# Patient Record
Sex: MALE | Race: WHITE | NOT HISPANIC OR LATINO | URBAN - METROPOLITAN AREA
[De-identification: names, ages, dates, MRNs, and addresses within clinical notes are randomized per-mention and may not be internally consistent; named-entity substitution may affect disease eponyms.]

---

## 2017-06-13 ENCOUNTER — OFFICE (OUTPATIENT)
Dept: URBAN - METROPOLITAN AREA CLINIC 78 | Facility: CLINIC | Age: 72
End: 2017-06-13
Payer: OTHER GOVERNMENT

## 2017-06-13 VITALS
SYSTOLIC BLOOD PRESSURE: 136 MMHG | HEIGHT: 69 IN | HEART RATE: 72 BPM | TEMPERATURE: 97.2 F | WEIGHT: 193 LBS | DIASTOLIC BLOOD PRESSURE: 71 MMHG

## 2017-06-13 DIAGNOSIS — K59.09 OTHER CONSTIPATION: ICD-10-CM

## 2017-06-13 DIAGNOSIS — R10.12 LEFT UPPER QUADRANT PAIN: ICD-10-CM

## 2017-06-13 DIAGNOSIS — R12 HEARTBURN: ICD-10-CM

## 2017-06-13 PROCEDURE — 99204 OFFICE O/P NEW MOD 45 MIN: CPT

## 2017-06-13 NOTE — SERVICEHPINOTES
JES MEDRANO   is a   72   year old male who is being seen in consultation at the request of   TRE MERRILL   for stomach pain. His wife is with him. He has been having stomach issues with epigastric pain, substernal burning, belching issues over the past 6 weeks. He reports LUQ discomfort which actually feels better after eating. Sensation of "hot air" coming up through esophagus is happening all the time, regardless of food intake. He was started on ranitidine 150 mg BID around 4 weeks ago and this hasn't seemed to make any difference in his symptoms. Hasn't had significant GERD hx in past. Went to the ER where CBC, CMP, lipase ok though plts slightly low (reports seeing hematology for that). A CT of abd/pelvis w contrast was negative for acute findings. Was given script for Prilosec- hasn't started yet.   He notes a change in bowel habits with slight tendency for constipation. This started at same time as UGI symptoms, and both issues started within a few weeks of starting venlafaxine by psychiatry for depressive symptoms. Last colonoscopy was 10 years ago. Has h/o CAD, s/p triple bypass in 2012 and also had AAA repair in 2013. Follows with cardiology regularly, doing well.

## 2017-07-19 ENCOUNTER — ON CAMPUS - OUTPATIENT (OUTPATIENT)
Dept: URBAN - METROPOLITAN AREA HOSPITAL 14 | Facility: HOSPITAL | Age: 72
End: 2017-07-19
Payer: OTHER GOVERNMENT

## 2017-07-19 DIAGNOSIS — K29.60 OTHER GASTRITIS WITHOUT BLEEDING: ICD-10-CM

## 2017-07-19 DIAGNOSIS — R10.13 EPIGASTRIC PAIN: ICD-10-CM

## 2017-07-19 DIAGNOSIS — K20.8 OTHER ESOPHAGITIS: ICD-10-CM

## 2017-07-19 PROCEDURE — 43239 EGD BIOPSY SINGLE/MULTIPLE: CPT

## 2017-07-26 ENCOUNTER — ON CAMPUS - OUTPATIENT (OUTPATIENT)
Dept: URBAN - METROPOLITAN AREA HOSPITAL 14 | Facility: HOSPITAL | Age: 72
End: 2017-07-26
Payer: OTHER GOVERNMENT

## 2017-07-26 DIAGNOSIS — K59.09 OTHER CONSTIPATION: ICD-10-CM

## 2017-07-26 PROCEDURE — 45378 DIAGNOSTIC COLONOSCOPY: CPT

## 2017-11-03 ENCOUNTER — OFFICE (OUTPATIENT)
Dept: URBAN - METROPOLITAN AREA CLINIC 33 | Facility: CLINIC | Age: 72
End: 2017-11-03
Payer: OTHER GOVERNMENT

## 2017-11-03 VITALS
TEMPERATURE: 97.5 F | WEIGHT: 186 LBS | SYSTOLIC BLOOD PRESSURE: 121 MMHG | HEART RATE: 75 BPM | DIASTOLIC BLOOD PRESSURE: 70 MMHG | HEIGHT: 69 IN

## 2017-11-03 DIAGNOSIS — R14.0 ABDOMINAL DISTENSION (GASEOUS): ICD-10-CM

## 2017-11-03 DIAGNOSIS — R10.12 LEFT UPPER QUADRANT PAIN: ICD-10-CM

## 2017-11-03 DIAGNOSIS — R19.5 OTHER FECAL ABNORMALITIES: ICD-10-CM

## 2017-11-03 PROCEDURE — 99215 OFFICE O/P EST HI 40 MIN: CPT

## 2017-11-03 RX ORDER — RIFAXIMIN 550 MG/1
TABLET ORAL
Qty: 42 | Refills: 0 | Status: COMPLETED
Start: 2017-11-03 | End: 2018-01-18

## 2017-11-03 NOTE — SERVICEHPINOTES
73 yo white male presents with ongoing stomach pain. His wife is with him. Was seen in June for relatively new-onset LUQ pain, bloating, and constipation. (Symptoms had started after he was started on venlafaxine which he is no longer taking). He had an EGD and colonoscopy in July without concerning findings. Continued to have symptoms. Had been given PPI and FDGard to try - apparently not helpful. He had his GB removed in September due to gallstones but without significant change in his GI symptoms. Typically feels well when he awakes in AM but can start having symptoms about an hour after he eats. Will have burning pain in abdomen (LUQ area) and heartburn/belching and feeling of heat in neck area. Gets bloated and can feel nauseated. He gets dry mouth. Symptoms dissipate with time but then worsen again after his next meal. Acid blocking meds have not been helpful. He has low energy. When symptoms are present he becomes quite anxious. He was at ER recently due to symptoms and was started on Levaquin for pneumonia findings on CT (chest CTA done -had elevated D dimer also had CT abd/pelvis without acute findings). Was also given Prevacid 15 mg since hadn't been on any antacid meds at that time. Currently denies constipation. Stools can be somewhat loose.Has h/o pre-diabetes. Also chronic mild thrombocytopenia for which he saw heme in past. Has h/o CAD, s/p triple bypass in 2012 and also had AAA repair in 2013. Follows with cardiology regularly, doing well.

## 2018-01-18 ENCOUNTER — OFFICE (OUTPATIENT)
Dept: URBAN - METROPOLITAN AREA CLINIC 78 | Facility: CLINIC | Age: 73
End: 2018-01-18

## 2018-01-18 VITALS
WEIGHT: 186 LBS | HEART RATE: 68 BPM | TEMPERATURE: 97.7 F | DIASTOLIC BLOOD PRESSURE: 60 MMHG | HEIGHT: 69 IN | SYSTOLIC BLOOD PRESSURE: 143 MMHG

## 2018-01-18 DIAGNOSIS — R14.0 ABDOMINAL DISTENSION (GASEOUS): ICD-10-CM

## 2018-01-18 DIAGNOSIS — R10.12 LEFT UPPER QUADRANT PAIN: ICD-10-CM

## 2018-01-18 DIAGNOSIS — R19.5 OTHER FECAL ABNORMALITIES: ICD-10-CM

## 2018-01-18 PROCEDURE — 99214 OFFICE O/P EST MOD 30 MIN: CPT

## 2018-01-18 RX ORDER — RIFAXIMIN 550 MG/1
TABLET ORAL
Qty: 42 | Refills: 0 | Status: COMPLETED
Start: 2018-01-18 | End: 2018-03-29

## 2018-03-01 ENCOUNTER — OFFICE (OUTPATIENT)
Dept: URBAN - METROPOLITAN AREA CLINIC 78 | Facility: CLINIC | Age: 73
End: 2018-03-01
Payer: COMMERCIAL

## 2018-03-01 VITALS
HEIGHT: 69 IN | TEMPERATURE: 97.6 F | DIASTOLIC BLOOD PRESSURE: 72 MMHG | SYSTOLIC BLOOD PRESSURE: 127 MMHG | HEART RATE: 69 BPM | WEIGHT: 183 LBS

## 2018-03-01 DIAGNOSIS — Z11.59 ENCOUNTER FOR SCREENING FOR OTHER VIRAL DISEASES: ICD-10-CM

## 2018-03-01 DIAGNOSIS — R10.12 LEFT UPPER QUADRANT PAIN: ICD-10-CM

## 2018-03-01 DIAGNOSIS — R29.90 UNSPECIFIED SYMPTOMS AND SIGNS INVOLVING THE NERVOUS SYSTEM: ICD-10-CM

## 2018-03-01 DIAGNOSIS — R19.5 OTHER FECAL ABNORMALITIES: ICD-10-CM

## 2018-03-01 DIAGNOSIS — D69.6 THROMBOCYTOPENIA, UNSPECIFIED: ICD-10-CM

## 2018-03-01 DIAGNOSIS — R14.0 ABDOMINAL DISTENSION (GASEOUS): ICD-10-CM

## 2018-03-01 PROCEDURE — 99215 OFFICE O/P EST HI 40 MIN: CPT

## 2018-03-01 NOTE — SERVICEHPINOTES
73 yo white male presents with his wife for f/u above. Magic Mouthwash was not very helpful. Xifaxan has been helpful. He is currently finishing up a second course of this. He reports normalized BMs (no longer loose) and improvement in bloating. He can still have his LUQ pain but it has improved as well, and today he reports that the left-sided pain has actually been there for many years. Feels better if he gets up and moves around. Notes that he used to drink a lot of alcohol. His anxiety symptoms have improved but he has some weakness in legs and balance issues at times and notes burning sensation in ears, head, and eyes- intermittently. Patient is following lower FODMAP diet and trying to also follow some recommendations based on specific carbohydrate diet as well (his wife is reading a book on this).   Prior hx: Was seen in June for relatively new-onset LUQ pain, bloating, and constipation. (Symptoms had started after he was started on venlafaxine which he is no longer taking). He had an EGD and colonoscopy in July without concerning findings. Continued to have symptoms. Had been given PPI and FDGard to try - apparently not helpful. Also tried IBgard and probiotics. He had his GB removed in September due to gallstones but without significant change in his GI symptoms. His O&ampPx3 was negative and GES without delayed emptying. His weight is stable.Typically feels well when he awakes in AM but can start having symptoms about an hour after he eats. Will have burning pain in abdomen (LUQ area) and heartburn/belching and feeling of heat in neck area. Gets bloated and can feel nauseated. He gets dry mouth. Symptoms dissipate with time but then worsen again after his next meal. Acid blocking meds have not been helpful. He has low energy. When symptoms are present he becomes quite anxious. Has h/o pre-diabetes. Also chronic mild thrombocytopenia for which he saw heme in past. Has h/o CAD, s/p triple bypass in 2012 and also had AAA repair in 2013. Follows with cardiology regularly, doing well.

## 2018-03-01 NOTE — INTERFACERESULTNOTES
Labs are fairly unremarkable other than low platelets and elevated ammonia. Recommend Fibroscan to assess for fat and scar tissue in the liver.

## 2018-03-12 LAB
AMMONIA (P): 71 UMOL/L — HIGH (ref ?–47)
C-REACTIVE PROTEIN: 0.3 MG/L (ref ?–8)
CBC (INCLUDES DIFF/PLT): ABSOLUTE BAND NEUTROPHILS: NORMAL CELLS/UL
CBC (INCLUDES DIFF/PLT): ABSOLUTE BASOPHILS: 29 CELLS/UL (ref 0–200)
CBC (INCLUDES DIFF/PLT): ABSOLUTE BLASTS: NORMAL CELLS/UL
CBC (INCLUDES DIFF/PLT): ABSOLUTE EOSINOPHILS: 110 CELLS/UL (ref 15–500)
CBC (INCLUDES DIFF/PLT): ABSOLUTE LYMPHOCYTES: 1264 CELLS/UL (ref 850–3900)
CBC (INCLUDES DIFF/PLT): ABSOLUTE METAMYELOCYTES: NORMAL CELLS/UL
CBC (INCLUDES DIFF/PLT): ABSOLUTE MONOCYTES: 307 CELLS/UL (ref 200–950)
CBC (INCLUDES DIFF/PLT): ABSOLUTE MYELOCYTES: NORMAL CELLS/UL
CBC (INCLUDES DIFF/PLT): ABSOLUTE NEUTROPHILS: 4089 CELLS/UL (ref 1500–7800)
CBC (INCLUDES DIFF/PLT): ABSOLUTE NUCLEATED RBC: NORMAL CELLS/UL
CBC (INCLUDES DIFF/PLT): ABSOLUTE PROMYELOCYTES: NORMAL CELLS/UL
CBC (INCLUDES DIFF/PLT): BAND NEUTROPHILS: NORMAL %
CBC (INCLUDES DIFF/PLT): BASOPHILS: 0.5 %
CBC (INCLUDES DIFF/PLT): BLASTS: NORMAL %
CBC (INCLUDES DIFF/PLT): COMMENT(S): NORMAL
CBC (INCLUDES DIFF/PLT): EOSINOPHILS: 1.9 %
CBC (INCLUDES DIFF/PLT): HEMATOCRIT: 40 % (ref 38.5–50)
CBC (INCLUDES DIFF/PLT): HEMOGLOBIN: 13.9 G/DL (ref 13.2–17.1)
CBC (INCLUDES DIFF/PLT): LYMPHOCYTES: 21.8 %
CBC (INCLUDES DIFF/PLT): MCH: 31.4 PG (ref 27–33)
CBC (INCLUDES DIFF/PLT): MCHC: 34.8 G/DL (ref 32–36)
CBC (INCLUDES DIFF/PLT): MCV: 90.5 FL (ref 80–100)
CBC (INCLUDES DIFF/PLT): METAMYELOCYTES: NORMAL %
CBC (INCLUDES DIFF/PLT): MONOCYTES: 5.3 %
CBC (INCLUDES DIFF/PLT): MPV: 12.1 FL (ref 7.5–12.5)
CBC (INCLUDES DIFF/PLT): MYELOCYTES: NORMAL %
CBC (INCLUDES DIFF/PLT): NEUTROPHILS: 70.5 %
CBC (INCLUDES DIFF/PLT): NUCLEATED RBC: NORMAL /100 WBC
CBC (INCLUDES DIFF/PLT): PLATELET COUNT: 111 THOUSAND/UL — LOW (ref 140–400)
CBC (INCLUDES DIFF/PLT): PROMYELOCYTES: NORMAL %
CBC (INCLUDES DIFF/PLT): RDW: 12.9 % (ref 11–15)
CBC (INCLUDES DIFF/PLT): REACTIVE LYMPHOCYTES: NORMAL %
CBC (INCLUDES DIFF/PLT): RED BLOOD CELL COUNT: 4.42 MILLION/UL (ref 4.2–5.8)
CBC (INCLUDES DIFF/PLT): WHITE BLOOD CELL COUNT: 5.8 THOUSAND/UL (ref 3.8–10.8)
COMPREHENSIVE METABOLIC PANEL: ALBUMIN/GLOBULIN RATIO: 2 (CALC) (ref 1–2.5)
COMPREHENSIVE METABOLIC PANEL: ALBUMIN: 4.3 G/DL (ref 3.6–5.1)
COMPREHENSIVE METABOLIC PANEL: ALKALINE PHOSPHATASE: 71 U/L (ref 40–115)
COMPREHENSIVE METABOLIC PANEL: ALT: 29 U/L (ref 9–46)
COMPREHENSIVE METABOLIC PANEL: AST: 26 U/L (ref 10–35)
COMPREHENSIVE METABOLIC PANEL: BILIRUBIN, TOTAL: 0.7 MG/DL (ref 0.2–1.2)
COMPREHENSIVE METABOLIC PANEL: BUN/CREATININE RATIO: (no result) (CALC)
COMPREHENSIVE METABOLIC PANEL: CALCIUM: 9.3 MG/DL (ref 8.6–10.3)
COMPREHENSIVE METABOLIC PANEL: CARBON DIOXIDE: 27 MMOL/L (ref 20–31)
COMPREHENSIVE METABOLIC PANEL: CHLORIDE: 109 MMOL/L (ref 98–110)
COMPREHENSIVE METABOLIC PANEL: CREATININE: 0.95 MG/DL (ref 0.7–1.18)
COMPREHENSIVE METABOLIC PANEL: EGFR AFRICAN AMERICAN: 92 ML/MIN/1.73M2 (ref 60–?)
COMPREHENSIVE METABOLIC PANEL: EGFR NON-AFR. AMERICAN: 80 ML/MIN/1.73M2 (ref 60–?)
COMPREHENSIVE METABOLIC PANEL: GLOBULIN: 2.2 G/DL (CALC) (ref 1.9–3.7)
COMPREHENSIVE METABOLIC PANEL: GLUCOSE: 157 MG/DL — HIGH (ref 65–99)
COMPREHENSIVE METABOLIC PANEL: POTASSIUM: 3.9 MMOL/L (ref 3.5–5.3)
COMPREHENSIVE METABOLIC PANEL: PROTEIN, TOTAL: 6.5 G/DL (ref 6.1–8.1)
COMPREHENSIVE METABOLIC PANEL: SODIUM: 143 MMOL/L (ref 135–146)
COMPREHENSIVE METABOLIC PANEL: UREA NITROGEN (BUN): 17 MG/DL (ref 7–25)
FERRITIN: 141 NG/ML (ref 20–380)
HEMOGLOBIN A1C: 5.5 % OF TOTAL HGB (ref ?–5.7)
HEPATITIS C AB W/REFL TO HCV RNA, QN, PCR: HEPATITIS C ANTIBODY: NORMAL
HEPATITIS C AB W/REFL TO HCV RNA, QN, PCR: SIGNAL TO CUT-OFF: 0.02 (ref ?–1)
IRON AND TOTAL IRON BINDING CAPACITY: % SATURATION: 34 % (CALC) (ref 15–60)
IRON AND TOTAL IRON BINDING CAPACITY: IRON BINDING CAPACITY: 277 MCG/DL (CALC) (ref 250–425)
IRON AND TOTAL IRON BINDING CAPACITY: IRON, TOTAL: 94 MCG/DL (ref 50–180)
PROTHROMBIN TIME-INR: INR: 1
PROTHROMBIN TIME-INR: PT: 10.7 SEC (ref 9–11.5)
SED RATE BY MODIFIED WESTERGREN: 2 MM/H (ref ?–20)
VITAMIN B12/FOLATE, SERUM PANEL: FOLATE, SERUM: 22.1 NG/ML
VITAMIN B12/FOLATE, SERUM PANEL: VITAMIN B12: 435 PG/ML (ref 200–1100)
VITAMIN D,25-OH,TOTAL,IA: 39 NG/ML (ref 30–100)
ZINC: 66 MCG/DL (ref 60–130)

## 2018-03-20 LAB — PANCREATIC ELASTASE-1: >500 MCG/G

## 2018-03-29 ENCOUNTER — OFFICE (OUTPATIENT)
Dept: URBAN - METROPOLITAN AREA CLINIC 78 | Facility: CLINIC | Age: 73
End: 2018-03-29
Payer: COMMERCIAL

## 2018-03-29 VITALS
SYSTOLIC BLOOD PRESSURE: 156 MMHG | HEIGHT: 69 IN | HEART RATE: 63 BPM | TEMPERATURE: 97.6 F | WEIGHT: 181 LBS | DIASTOLIC BLOOD PRESSURE: 64 MMHG

## 2018-03-29 DIAGNOSIS — L29.8 OTHER PRURITUS: ICD-10-CM

## 2018-03-29 DIAGNOSIS — R14.0 ABDOMINAL DISTENSION (GASEOUS): ICD-10-CM

## 2018-03-29 DIAGNOSIS — D69.6 THROMBOCYTOPENIA, UNSPECIFIED: ICD-10-CM

## 2018-03-29 DIAGNOSIS — R19.5 OTHER FECAL ABNORMALITIES: ICD-10-CM

## 2018-03-29 DIAGNOSIS — R29.90 UNSPECIFIED SYMPTOMS AND SIGNS INVOLVING THE NERVOUS SYSTEM: ICD-10-CM

## 2018-03-29 DIAGNOSIS — R10.12 LEFT UPPER QUADRANT PAIN: ICD-10-CM

## 2018-03-29 PROCEDURE — 99215 OFFICE O/P EST HI 40 MIN: CPT

## 2018-03-29 RX ORDER — CROMOLYN SODIUM 20 MG/ML
SOLUTION ORAL
Qty: 1440 | Refills: 3 | Status: ACTIVE

## 2018-03-29 NOTE — SERVICEHPINOTES
73 yo white male presents with his wife for f/u abdominal pain. He is a very poor historian (question memory  issues?) his wife does somewhat better with history though seems to struggle somewhat as well - though does report traveling a lot. Patient had reported doing better on Xifaxan (at [facility] dosing- took 2 courses) which he was finishing up at time of last OV on 3/1. Today he states it did not help. He continues to complain of various symptoms - mostly his chronic LUQ pain but also reports that he can have discomfort in right side of abdomen as well. States he usually feels better lying down, which is opposite of what he mentioned last time that he felt better moving around. He complains of dry mouth, depression, and now also reports pruritis of abdomen, low back, head, neck, and face. No rashes. They were to have seen neurology but have not made any appointments. His labs show chronic thrombocytopenia (wife reports h/o seeing hematology for this in past - apparently negative w/u) and elevated ammonia level. They have Fibroscan of liver scheduled for April.   Patient had previously reported h/o heavy ETOH use in past (not recently). He has had anxiety issues, and also reports some weakness in legs and balance issues at times and notes burning sensation in ears, head, and eyes- intermittently. VSL #3 probiotics and Xifaxan thus far have seemed to help the most. He also started a supplement containing some 5-HTP, l-theanine, and B vitamins and seemed to have improvement on nervous teeth clenching issue. Prior hx: Was seen in June 2017 for what was then reported as relatively new-onset LUQ pain, bloating, and constipation. (Symptoms had apparently started after he was started on venlafaxine which he is no longer taking). He had an EGD and colonoscopy in July without concerning findings. Has had 2 CT scans as well, negative O&ampPx3, normal fecal elastase, and GES without delayed emptying. Lab data below.He has tried (without benefit) the following: PPI, FDGard, IBgard, Magic Mouthwash. Prescription anti-spasmodics not tried due to glaucoma hx. He had his GB removed in September due to gallstones but without significant change in his GI symptoms. He denied benefit from making dietary changes, including lower FODMAP diet in past and most recently lower carb changes based on specific carbohydrate diet related recommendations (his wife is reading a book on this). Has h/o CAD, s/p triple bypass in 2012 and also had AAA repair in 2013. Follows with cardiology regularly, doing well.3/9/18 AST/ALT 26/29, alb 4.3, bili 0.7, HgbA1C 5.5, zinc 66 (lln 60), INR 1.0, ESR 2, plt 111, iron sat 34, ferritin 141, Vit B12 435, folate 22, CRP 0.3, ammonia 71 (uln 47), Vit D 39, Hep C ab neg fecal elastase >500

## 2018-05-16 ENCOUNTER — OFFICE (OUTPATIENT)
Dept: URBAN - METROPOLITAN AREA CLINIC 78 | Facility: CLINIC | Age: 73
End: 2018-05-16
Payer: COMMERCIAL

## 2018-05-16 VITALS
HEIGHT: 69 IN | TEMPERATURE: 97.9 F | DIASTOLIC BLOOD PRESSURE: 70 MMHG | SYSTOLIC BLOOD PRESSURE: 144 MMHG | HEART RATE: 59 BPM | WEIGHT: 178 LBS

## 2018-05-16 DIAGNOSIS — R19.5 OTHER FECAL ABNORMALITIES: ICD-10-CM

## 2018-05-16 DIAGNOSIS — R10.12 LEFT UPPER QUADRANT PAIN: ICD-10-CM

## 2018-05-16 DIAGNOSIS — D69.6 THROMBOCYTOPENIA, UNSPECIFIED: ICD-10-CM

## 2018-05-16 DIAGNOSIS — F09: ICD-10-CM

## 2018-05-16 DIAGNOSIS — R14.0 ABDOMINAL DISTENSION (GASEOUS): ICD-10-CM

## 2018-05-16 PROCEDURE — 99215 OFFICE O/P EST HI 40 MIN: CPT

## 2018-05-16 NOTE — SERVICEHPINOTES
71 yo white male presents with his wife for f/u abdominal pain. He is a very poor historian and he did see neurology and got diagnosed with memory loss. Has started on Aricept. Since then has been doing better in terms of his abdominal pain. His Fibroscan was negative for liver cirrhosis or fibrosis. He feels the cromolyn has worked in terms of his pruritis symptoms. Currently with formed BMs. No other concerns today.   Prior hx: Was seen in June 2017 for what was then reported as relatively new-onset LUQ pain, bloating, and constipation. (Symptoms had apparently started after he was started on venlafaxine which he is no longer taking). He has been seen a number of times and had complained of various symptoms, often changing his story. He complained frequently of dry mouth, depression, and most recently had pruritis of abdomen, low back, head, neck, and face. No rashes. Patient had previously reported h/o heavy ETOH use in past (not recently). He has had anxiety issues, and also reports some weakness in legs and balance issues at times and notes burning sensation in ears, head, and eyes- intermittently. His labs show chronic thrombocytopenia (wife reports h/o seeing hematology for this in past - apparently negative w/u) and elevated ammonia level. Due to the ammonia level and mental sx, Fibroscan was done to R/O cirrhosis.  He had an EGD and colonoscopy in July without concerning findings. Has had 2 CT scans as well, negative O&ampPx3, normal fecal elastase, and GES without delayed emptying. Lab data below.He has tried (without benefit) the following: PPI, FDGard, IBgard, Magic Mouthwash. Prescription anti-spasmodics not tried due to glaucoma hx. He had his GB removed in September due to gallstones but without significant change in his GI symptoms. Xifaxan did seem to be helpful.He denied benefit from making dietary changes, including lower FODMAP diet in past and lower carb changes based on specific carbohydrate diet related recommendations (his wife was reading a book on this). Has h/o CAD, s/p triple bypass in 2012 and also had AAA repair in 2013. Follows with cardiology regularly, doing well.3/9/18 AST/ALT 26/29, alb 4.3, bili 0.7, HgbA1C 5.5, zinc 66 (lln 60), INR 1.0, ESR 2, plt 111, iron sat 34, ferritin 141, Vit B12 435, folate 22, CRP 0.3, ammonia 71 (uln 47), Vit D 39, Hep C ab neg fecal elastase >500

## 2023-01-14 ENCOUNTER — NURSE TRIAGE (OUTPATIENT)
Dept: OTHER | Facility: CLINIC | Age: 78
End: 2023-01-14

## 2023-01-14 NOTE — TELEPHONE ENCOUNTER
Dr. Michelle Montanez called back and recommended patient take 12.5mg per the discharge instructions for tomorrow and Monday morning (Office is closed on Monday) and call patient's cardiologist on Tuesday morning. Writer also reviewed signs and symptoms of hypotension with patient's wife. She is going to track his BP and call us back if it drops if he is develops any symptoms.

## 2023-01-15 ENCOUNTER — NURSE TRIAGE (OUTPATIENT)
Dept: OTHER | Facility: CLINIC | Age: 78
End: 2023-01-15

## 2023-01-16 NOTE — TELEPHONE ENCOUNTER
Location of patient: [place]    Received call from April at [facility] Name: [facility] Donovan Mention MRN: [id number]    Subjective: Caller states \"My  just came home from the hospital on Friday\"     Current Symptoms: Nausea and vomiting, X 3,     Denies dizziness,     Onset: 1 hour ago; sudden    Associated Symptoms: irritability     Pain Severity: 0/10; N/A; none    What has been tried: Ginger ale    What makes it better or worse: Nothing    Triage indicates for patient to Go to ED Now    Care advice provided, patient verbalizes understanding; denies any other questions or concerns; instructed to call back for any new or worsening symptoms.     Patient/caller agrees to proceed to local Emergency Department      This triage is a result of a call to the [facility], [facility]    Reason for Disposition   [1] MODERATE vomiting (e.g., 3 - 5 times/day) AND [2] age > 60 years    Protocols used: Vomiting-ADULT-